# Patient Record
Sex: MALE | Race: WHITE
[De-identification: names, ages, dates, MRNs, and addresses within clinical notes are randomized per-mention and may not be internally consistent; named-entity substitution may affect disease eponyms.]

---

## 2018-04-11 ENCOUNTER — HOSPITAL ENCOUNTER (OUTPATIENT)
Dept: HOSPITAL 95 - LAB SHORT | Age: 63
Discharge: HOME | End: 2018-04-11
Attending: INTERNAL MEDICINE
Payer: COMMERCIAL

## 2018-04-11 DIAGNOSIS — E78.4: Primary | ICD-10-CM

## 2018-04-11 DIAGNOSIS — R73.02: ICD-10-CM

## 2018-04-11 DIAGNOSIS — I10: ICD-10-CM

## 2018-04-11 LAB
CREAT UR-MCNC: 197 MG/DL (ref 27–270)
MICROALBUMIN UR-MCNC: 10.8 MG/L (ref 0–20)
MICROALBUMIN/CREAT UR: 5.48 MG/G (ref 0–30)

## 2019-04-03 ENCOUNTER — HOSPITAL ENCOUNTER (OUTPATIENT)
Dept: HOSPITAL 95 - LAB SHORT | Age: 64
Discharge: HOME | End: 2019-04-03
Attending: INTERNAL MEDICINE
Payer: COMMERCIAL

## 2019-04-03 DIAGNOSIS — F19.10: Primary | ICD-10-CM

## 2019-06-25 NOTE — NUR
PT. OUT WALKING IN HATCH WITH CANE, TOLERATING WELL .  PT. HAS BACK BRACE IN
PLACE.  NO NOTEABLE CHANGES THIS SHIFT.  RT HAS CHECKED OUT HIS CPAP AND SET
IT UP.

## 2019-06-25 NOTE — NUR
PT. SITTING ON EDGE OF BED, DRESSING ON LUMBAR SURGICAL INCISION STARTING TO
ROLL UP.  STRAIGHTENED IT OUT AND REINFORCED THE BOTTOM OF DRESSING.  DSG ON
DRAIN SITE SATURATED WITH CHILDERS FLUID.  REMOVED DRESSING AND CLEANED AREA WITH
SKINTEGRITY AND REPLACED WITH MEPILEX DRSG.

## 2019-06-26 NOTE — NUR
PT. SITTING ON EDGE OF BED EATING DINNER.  FAMILY AT BEDSIDE.  PT. PUT ON A
FLUID RESTRICITON TODAY OF 1500 ML.  HAS BEEN UP AMBULATING IN HATCH WITH P.T.
AND A CANE.  BRACE IN PLACE.  NO NOTEABLE CHANGES THIS SHIFT.

## 2019-06-26 NOTE — NUR
SHIFT SUMMARY
PATIENT IS ALERT AND ORIENTED. AMBULATED THROUGH THE HATCH WITH BACK BRACE ON.
PATIENT DID NOT BEND, TWIST OR LIFT ANYTHING HEAVIER THAN 5LBS. PATIENT DID
COMPLAIN OF PAIN AND INFORMED ME THAT THE SURGEON WANTED HIM TO BE GETTING HIS
PAIN MEDICATIONS Q3 FOR 11 DAYS. PATIENTS ORDERS WERE FOR Q4H. HOSPITALIST
CHANGED THIS TO Q3H. PATIENT SLEPT WITH CPAP ON. USES CALL LIGHT
APPROPRIATELY. INDEPENDENT IN ROOM. NO NEW CHANGES THROUGHOUT THE NIGHT.
VITALS STABLE. NO COMPLAINTS OF CHEST PAIN OR SOB THROUGHOUT SHIFT. DID
COMPLAIN OF RIGHT SHOULDER PAIN THAT IS CHRONIC.

## 2019-06-27 NOTE — NUR
DISCHARGE SUMMARY
PATIENT A&O X4. C/O PAIN T/O SHIFT. MEDICATED PER E MAR. DENIES SOB OR NAUSEA.
SURGICAL DRESSING IN PLACE TO LOWER BACK, WNL. MEPILEX DRESSING TO OLD DRAIN
SITE CHANGE THIS SHIFT, C/D/I NO DRAINAGE PRESENT. ALL DISCHARGE INSTRUCTIONS
REVIEWED WITH THE PATIENT. MEDICATIONS FAXED. PATIENT EDUCATION PROVIDED. IV
D/C, WNL. PATIENT SENT HOME ON AN 1,800 ML FLUID RESTRICTION. CNA ESCORTED
PATIENT OUT, ALL BELONGINGS IN HAND.

## 2019-06-27 NOTE — NUR
SHIFT SUMMARY: 62 Y/O OBESE MALE RESTED COMFORTABLY ALL SHIFT WHILE WEARING
C-PAP. PTS VOICED ADEQUATE PAIN CONTROL WITH OXYCODONE FOR BACK DISCOMFORT. PT
ALERT AND ORIENTED X 4; ABLE SWALLOW ALL MEDICAITIONS WITHOUT ISSUE. PT
WEARING BACK BRACE ALL SHIFT WITH BACK DRESSINGS DRY AND INTACT. TELEMETRY
REFLECTS NSR. PTS VANCOMYCIN TROUGH LEVEL WAS 18.9 WITH NEXT LEVEL DUE 6/27.
PTS BED LOW POSITION, CALL LIGHT AT SIDE.

## 2023-02-16 NOTE — NUR
END OF SHIFT NURSING REPORT  - PM
Patient admitted for acute respiratory failure with eTOH 363 and states
drinking wiskey last night. CIWA 7 and medicated with Librium. /74 ,
medicated with hydralazine as ordered.  Hx of sleep apnea, facility provided
CPAP initiated upon arrival. He declines pain, but states headache and feeling
anxious.  2 nurse skin assessment completed. Abdomen round and distended,
abdominal sounds active in all quadrants.
 
Opted IN for Flu Vaccine and administered to left deltoid.

## 2023-02-16 NOTE — NUR
PT IS A/OX4, PLEASANT AND COOPERATIVE. FLUSHED LOOK. PT IS UP WITH ASSIST TO
THE BATHROOM. PT IS SHAKEY AND HAS MILD HALUCINATIONS AT TIMES. ATIVAN AND
LIBRIUM GIVEN FOR WD SYMPTOMS. THE PT WAS GIVEN PRN HYDRAZOLINE FOR ELEVATED
BP TODAY. PT IS ON TELE. PT MEDICATED FOR PAIN X2 SO FAR TODAY. CALL LIGHT IN
REACH. WILL CONTINUE TO MONITOR AND ASSESS FOR CHANGES.

## 2023-02-17 NOTE — NUR
PT IS A/OX4, PLEASANT AND COOPERATIVE. THE PT IS UP IND IN HIS ROOM TO THE
BATHROOM. TODAY THE PT SEEMED TO HAVE LESS TREMOR COMPARED TO YESTERDAY. THE
PT DENIED ANY HALUCINATIONS TODAY. PT WAS GIVEN LIBRIUM X2 SO FAR TODAT. PT
WAS MEDICATED FOR CHRONIC BACK PAIN X3 SO FAR TODAY. PT RECIEVED BREATHING
TX'S T/O THE DAY. PT IS ON 02 1L/MIN PT WERE CPAP WHILE NAPPING. CALL LIGHT IN
REWACH. WILL CONTINUE TO MONITOR AND ASSESS FOR CHANGES

## 2023-02-17 NOTE — NUR
PT IS A&O4, SB TO THE BR, USES CPAP @NIGHT, CIWAS <7 OVERNIGHT, PT STATES
FEELS MUCH BETTER, NO COMPLAINTS OF PAIN OR DISCOMFORT,CONTINUE POC

## 2023-02-18 NOTE — NUR
SHIFT SUMMARY
A&O X 4. VSS. INDEPENDENT IN THE ROOM FOR RESTROOM USE. MEDICATED FOR C/O
CHRONIC BACK PAIN PER EMAR WITH GOOD RELIEF STATED BY PT. PT IS MAINTAINING O2
SATS >95% ON RA. WEARS C-PAP AT NOC WITH 2 L'S O2 BLEED IN. NEW PIV STARTED IN
L AC WITH 20 G X 1 ATTEMPT. PIV IN R AC LEAKING, DC'D WITH CATH TIP INTACT, NO
REDNESS OE SWELLING NOTED. PT PLEASANT & COOPERATIVE WITH ALL CARE.

## 2023-02-18 NOTE — NUR
SHIFT SUMMARY;
 
NO ACUTE CHANGES OVERNIGHT. THE PT IS AXO X4 AND INDEPENDENT TO THE BATHROOM.
THE PT USES CPAP AT NIGHT WITH 3L BLEED IN, O2 SATS WERE >92%. TELE IS IN
PLACE, [place] IN THE 50'S T/O THE NIGHT. THE OPT REQUESTED PRN PAIN
MEDICATION TWICE FOR CHRONIC PAIN LAST NIGHT. THE PT WAS HYPERTENSIVE LAST,
HYDRALAZINE WAS GIVEN TWICE. THIS AM THE PTS B/P REMAINED UNCHANGED FROM
RECIEVEING HYDRALAZINE.
CURRENTLY THE PT IS RESTING IN BED WITH NO ACUTE COMPLAINTS, THE BED IS
IN THE LOWEST POSITION AND THE CALL LIGHT IS AT BEDSIDE.

## 2023-02-19 NOTE — NUR
LYING IN SEMI FOWLERS WITH EYES OPEN WHILE EATING A SNACK AND WATCHING TV.
HAS RESTED MINIMALLY THIS SHIFT.  HE WAS ABLE TO SLEEP FROM 11M TO 5AM.  ONCE
HE WOKE UP HE WAS UPSET THAT NURSING DIDN'T WAKE HIM UP TO MEDICATE HIM FOR
PAIN.  NURSING REITERATED THAT PRN MEDS ARE GIVEN WHEN ASKED FOR AND SCHEDULED
MEDS ARE GIVEN WHEN SCHEDULED. AND THAT HIS PAIN MEDS ARE PRN.  HE VOICED
UNDERSTANDING FOR THE SECOND TIME, BUT STATED THAT HE BELIEVED THAT HIS PAIN
MEDS SHOULD BE SCHEDULED.  CONTINENT OF BOWEL AND BLADDER, ABLE TO AMBULATE TO
BATHROOM.  CURRENTLY DENIES PAIN, DISCOMFORT, OR FURTHER NEEDS.  SAFETY
MEASURES IN PLACE.  WILL GIVE HAND OFF TO ONCOMING SHIFT USING SBAR DURING
BEDSIDE REPORT.  WILL CONTINUE TO MONITOR AND ADDRESS NEEDS AS THEY ARISE.

## 2023-02-20 NOTE — NUR
SHIFT SUMMARY;
 
NO ACUTE CHANGES OVERNIGHT. THE PT RESTED IN BED T/O THE NIGHT. THE PT IS AXO
X4 AND INDEPENDENT IN THE ROOM. THE PT WAS MEDICATED WITH FLEXIRIL AND
PERCOCET FOR CHRONIC PAIN OVER THE NIGHT. TELE IS IN PLACE, SIN[place]
40/50'S T/O THE NIGHT. THE PT SLEPT WITH HIS CPAP ON, 2L BLEED IN WITH O2 SATS
>95%. CURRENTLY THE PT IS RESTING IN BED WITH THE BED IN THE LOWEST POSITION
AND THE CALL LIGHT AT BEDSIDE.

## 2023-02-20 NOTE — NUR
PT BROUGHT DOWN TO ER ENTRANCE AND PICKED UP BY [employer]IM-FRIEND TO BRING HIM
HOME. PT STABLE. PT LEFT WITH ALL BELONGINGS AND SIGNED ALL REQUIRED
PAPERWORK.

## 2023-09-17 NOTE — NUR
PT ARRIVED TO  351 VIA GURNEY FROM THE ER. A/O X 4. ABLE TO TRANSFER SELF TO
 BED. PT CAN STAND AND MOVE INDEPENDENTLY. WILL CONTINUE TO PROVIDE CARE T/O
SHIFT. CALL LT IN REACH.

## 2023-09-18 NOTE — NUR
NOTE:
 
NOTIFED BY TELE OF PT SUSTAINING 130'S-160'S, PROVIDER NOTIFIED.  IV LOPRESSOR
ADMINISTERED, PT'S HR NOW AT 87.

## 2023-09-18 NOTE — NUR
NOTIFIED DR THAT PT WAS UNABLE TO VOID AND REQUEST A RODRIGUEZ CATHETER. ORDER
GIVEN. 16 COUDE RODRIGUEZ PLACED FOR RETENTION USING STERILE TECHNIQUE.
IMMEDIATELY AFTER INSERTION GREAT RETURN OF YELLOW URINE FLOWED INTO BAG. PT
STATES FEELING RELIEF. PT HAD TRIED TO VOID BEFORE CATHETER PLACEMENT AND
HEART RATE BECAME ELEVATED. DR WAS NOTIFIED OF ELEVATED HEART RATE. ORDER TO
DO AN EKG WAS RECEIVED AS WELL AS IV LOPRESSOR. PT IS ON TELE.

## 2023-09-18 NOTE — NUR
CPAP IN PLACE. CONT OXIMETER ON, SATS 93%. NS INFUSING  MLS/HR, FOLIC
ACID INFUSING, KCL IVPB INFUSING. CALL LT IN REACH.

## 2023-09-18 NOTE — NUR
SHIFT SUMMARY
VSS. ON RM AIR, SATS >90%. TELEMETRY SHOWING SR 80'S. NO REPORT OF CHEST
PAIN/PRESSURE. AOX3-4. CIWA'S RANGING 4-18. CURRENTLY EXPERIENCING ANXIETY AND
VISUAL HALLUCINATIONS. BED ALARM IN PLACE. ADMINISTERED
TOTAL OF 4MG IV ATIVAN AND 50MG LIBRIUM.  WILL CONTINUE TO MONITOR. PT
REPORTED ONGOING CONSTIPATION, DESPITE ORAL SOFTENERS ADMINISTERED PRIOR TO
TRANSFER TO UNIT. PRUNE JUICE PROVIDED W/ NO RELIEF. AT PT REQUEST,
ADMINISTERED DULCALOX PER EMAR, MULTIPLE LOOSE BM'S SINCE. AMBULATING TO
RESTROOM W/ SBA FWW. CATHETER IN PLACE DRAINING SUJATA, RED-TINGED URINE. WILL
REPORT TO ONCOMING RN.

## 2023-09-18 NOTE — NUR
ASSUMPTION OF CARE
PT ARRIVED TO UNIT VIA HOSPITAL BED AT APPROX 1145. BEDSIDE REPORT COMPLETED.
AOX4, COOPERATIVE W/ CARE AND RECEPTIVE TO EDUCATION. ACUTE ETOH, DENIES
VISUAL HALLUCINATIONS AT THIS TIME. BED ALARM ON. SEE WITHDRAWAL
DOCUMENTATION. VSS. TELEMETRY SHOWING SR 70'S. DENIES CP/PRESSURE. CURRENTLY
ON RM AIR, SATS >90%.  BIPAP PRESENT AT BEDSIDE. PT AMBULATES W/ SBA FWW TO
RESTROOM, REPORTS CONSTIPATION, PROVIDED PRUNE JUICE. WILL CONTINUE TO
MONITOR.  RODRIGUEZ CATHETER IN PLACE DRAINING TO GRAVITY, DRAINING SUJATA URINE.
IS ABLE TO INDEPENDENTLY CHANGE POSITION IN BED. CALL LIGHT IN REACH.

## 2023-09-19 NOTE — NUR
ASSUMED CARE
 
PATIENT ARRIVED TO ICU FROM PCU @ 0740. PATIENT IS LAYING IN BED WITH EYES
CLOSED ON ROOM AIR. SPO2 GREATER THAN 90%. PATIENT ANSWERS QUESTIONS WITH
SLURRED SPEECH AND WHISPERING. NO FAMILY AT BEDSIDE. BELONGINGS PALCED IN
CUPBOARD IN ROOM. CIWA 14 AT TIME OF ARRIVAL, PRECEDEX STARTED @ 0.3MCG/KG/HR.
PADDING APPLIED TO BED SIDERAILS FOR SEIZRURE PRECAUTIONS AND SUCTION AT
BEDSIDE. RODRIGUEZ PATENT AND DRAINING DARK ORANGE URINE TO GRAVITY. BEDSIDE
REPORT RECEIVED FROM NITA WEBSTER.

## 2023-09-19 NOTE — NUR
AOC:
PATIENT ON ASSUMPTION WAS MINIMALLY WITHDRAWALING, THEN AS THE SHIFT
PROGRESSED AROUND 2000 STARTED TO GO THROUGH INCREASED MENTATION, WITH
UNSAFE BEHAVIORS, LEAVING THE BED, TRYING TO FALL OFF THE RAILING.
PULLING AT LINES, CORDS, RODRIGUEZ. VISUALIZED PATIENT HAVING HALLUCINATIONS.
PATIENT ALERT TO SELF AND PLACE( AT TIMES ), CHRONIC FOEY WAS PINK DUE TO
HIM PULLING AT CATH, SITTING ON CATHETER, PULLING. IS STARTING TO
MINIMALLY LIGHTENING TO SUJATA. PATIENT SINCE THE BEGINNING OF THE SHIFT
HAS RECIEVED 50 OF LIBRIUM AND 14mg OF ATIVAN, AS PATIENT I DO NOT
BELIEVE WOULD BE CAPABLE OF ALERTLY SWALLOWING. PATIENT SANDY IS
WEARING CPAP, SPO2 >94% BED ALARM ON ICU RN HAS ALREADY INSPECTED
PATIENT. POWERGLIDE WAS PLACED. PATIENT STILL PREFORMING UNSAFE BEHAVIORS
AS SOON AS THE ATIVAN WEARS OFF.

## 2023-09-19 NOTE — NUR
SHIFT SUMMARY
 
PATIENT REMAINED OFF PRECEDEX FOR MOST OF THE SHIFT D/T PROLONGED QTC AND CIWA
APPROPRIATE FOR ATIVAN. MEDICATED WITH ATIVAN PER EMAR. PATIENT HAD ONE RUN OF
SVT BUT CONVERTED BACK TO SINUS RHYTHM. PERIODS OF IRREGULAR TACHY RHYTHM ON
MONITOR. METOPROLOL 5MG IV PUSH Q5M X 3 DOSES PRN ORDERED BY DR. EGAN IF
NEEDED. BIPAP PLACED ON PATIENT AT 16/7 WITH SPO2 LOW TO MID 90'S. RODRIGUEZ
PATENT AND DRAINING TO GRAVITY. NO OTHER CHANGES DURING SHIFT.

## 2023-09-19 NOTE — NUR
ATIVAN ORDERS
 
CURRENT CIWA 15. ORDERS IN EMAR ARE FOR PO ATIVAN BUT PATIENT IS AN ASPIRATION
RISK R/T AMS. CALL MADE TO DR. EGAN FOR UPDATED ATIVAN ORDERS TO BE IV.
TELEPHONE ORDERS RECEIVED.

## 2023-09-19 NOTE — NUR
PATIENT RESTLESS AND MOANING, OPENS EYES TO VERBAL, MOANING WHEN ASKED
QUESTIONS, BIPAP OFF AND ORAL CARE DONE PATIENT RESP 30'S AND CONTINUES TO BE
RESTLESS. PRECEDEX 0.2 STARTED AND ORDER FOR IV PAIN CONTROL OBTAINED DUE TO
ASPIRATION RISK.

## 2023-09-19 NOTE — NUR
EOS:
PATIENT WITH MUCH IMPROVEMENT WITH ALMOST MAXED OUT ATIVAN FOR 24 HOUR,
URINE SUJATA LESSENING PINK. CURRENLTY ON CPAP OR BIPAP RT INVOLVED WITH
SATURATING WELL. PATIENT WAS BATHED PLACED ON GREEN LIFT SHEET, AND WAS
EDUCTED, AND ALMOST 1:1 FOR MULTIPLE HOURS THROUGH THE NIGHT, CURRENTLY
RESTING WELL SATURATING WELL. VSS IMPROVING CLONIDINE .1mg PATCH PLACED
LEONARDO. HYPERTENSIVE AND MODERATELY IMPROVED THIS AM.

## 2023-09-19 NOTE — NUR
PROLONGED QTC.
 
PATIENT QTC ON MONITOR SHOWS PROLONGATION OVER 600. EKG COMPLETED AND SHOWED
QTC . PRECEDEX PLACED ON SB AND CALL PLACED TO DR. EGAN TO NOTIFY. DR. EGAN TO COME SEE PATIENT AT BEDSIDE.

## 2023-09-19 NOTE — NUR
EKG RECHECK
 
EKG PERFORMED TO RECHECK QTC AFTER CALCIUM GLUCONATE INF. NEW QTC 554MS DOWN
FROM 652MS THIS MORNING. DR. EGAN NOTIFIED.

## 2023-09-20 NOTE — NUR
AFTER REPOSITIONING PATIENT VERBALIZED I NEED TO GET UP, SAYING YES TO PAIN.
PT UNABLE TO EXPLAIN WHERE HE WAS HURTING. PATIENT RELAXING AFTER FENTANYL IV.
PRECEDEX 0.2 CONTINUES

## 2023-09-20 NOTE — NUR
SUMMARY
PATIENT MORE AWAKE AS NIGHT PROGRESSED. ABLE TO FOLLOW SIMPLE DIRECTIONS, BUT
HAVING CONFUSED CONVERSATIONS. PRECEDEX 0.2 MCG INFUSING. FENTANYL GIVEN FOR
PAIN AND ATIVAN GIVEN WHEN HAVING INCREASED AGITATION, AND ANXIETY.
ATTEMPTING TO PROVIDE MINIMAL SEDATION DUE TO PROLONGED QT. HOLDING PO
MEDICATIONS DUE TO ASPIRATION RISK. RODRIGUEZ REMAINS IN PLACE DRAINING DARK
ORANGE URINE HAS LIGHTENED SLIGHTLY AS NIGHT PROGRESSED.

## 2023-09-20 NOTE — NUR
SHIFT SUMMARY
 
PATIENT PRECEDEX TITRATED UP TO 0.3MCG/KG/HR AND NS @ 100ML/HR. MEDICATED
WITH ATIVAN FOR CIWA 14-15 PER EMAR. KCL 40MEQ INF NOW FOR POTASSIUM OF 2.4.
ADDITIONAL 20MEQ ORDERED FOR A TOTAL OF 60MEQ TO BE GIVEN.
 
PATIENT WAS ABLE TO CONVERSE WITH STAFF, BUT IS STILL CONFUSED AND NOT
ORIENTED. BIPAP UTILIZED INTERMITTENTLY T/O DAY.  PATIENT FREQUENTLY PULLING
OFF MASK.
 
PATIENT REMAINS NPO D/T ASP RISK. NO BM THIS SHIFT. 2L OUT OF RODRIGUEZ.
 
NO OTHER CHANGES DURING SHIFT.

## 2023-09-20 NOTE — NUR
ASSESSMENT:
PT CONFUSED AND ORIENTED ONLY TO SELF. SPEECH IS GARBLED WITH SOME SENSICAL
WORDS, BUT PHRASES DON'T PERTAIN TO THE QUESTION ASKED.
LS DIMINISHED T/O WITH BIOX 93-97% ON RA. KEPT PULLING AT BIPAP MASK; SO KEPT
IT OFF FOR NOW, WILL TRY AGAIN LATER.
HEART SOUNDS IRREGULAR WITH MONITOR SHOWING SR WITH PAC'S WITH HR . SKIN
PINK, WARM AND DRY. PPP BILAT AND STRONG. BILAT RADIAL PULSES STRONG.
20G IV LFA FLUSHED AND S/L. POWERGLIDE LEONARDO WITH PRECEDEX AT 11.4CC/HR=
0.3MCG/KG/HR WITH A NS @ 10CC/HR. POWERGLIDE DRAWS BACK BLOOD.
ABD R/D AND FIRM WITH HYPO BTX4.
RODRIGUEZ DRAINING DARK YELLOW URINE.

## 2023-09-21 NOTE — NUR
SHIFT SUMMARY
 
NO ACUTE EVENTS THIS SHIFT AFTER MORNING EPISODE OF SVT. PATIENT RECEIVED A
TOTAL OF 80 MEQ POTASSIUM IV, 1G CALCIUM GLUCONATE IV, AND 2G MAG SULFATE IV.
REPEAT POTASSIUM LAB DRAWN, BUT NOT RESULTED YET.
 
PRECEDEX TITRATED OFF. PATIENT PASSED BEDSIDE SWALLOW EVAL AND IS NOW ABLE TO
TAKE PO MEDS. MEDICATED WITH ATIVAN, LIBRIUM, AND OXYCODONE TODAY PER EMAR.
PATIENT IS A&O X 3. INTERMITTENTLY CONFUSED ON PREVIOUS CONVERSATIONS HAD, BUT
KNOWS PLACE, YEAR, AND SITUATION. REMAINS ON 2LPM VIA NC T/O SHIFT WITH SPO2
HIGH 90'S AND NO APNEIC OR OBSTRUCTIVE EPISODES. RODRIGUEZ PATENT AND DRAINED OVER
1600ML URINE. NO BM THIS SHIFT. ECHO COMPLETED.

## 2023-09-21 NOTE — NUR
SHIFT ASSESSMENT:
PT A&O X4. HAS SOME CONFUSED CONVERSATION, BUT HE'S APPROPRIATE WITH CARE. C/O
PAIN IN SHOULDERS AND BACK; STATES HE'S USED TO WALKING AND NOT BEING IN A BED
ALL DAY.
LS CLEAR WITH WHEEZES IN UPPER LOBES, BUT DIMINISHED IN BASES WITH BIOX 98% ON
2L N/C. KEEPS C/O SOB BUT STATES HE'S NOT READY TO WEAR HIS BIPAP.
HEART SOUNDS IRREGULAR WITH MONIOTR SHOWING SR WITH PAC'S WITH HR 'S.
PPP BILAT AND STRONG. BILAT RADIAL PULSES STRONG. POWERGLIDE LEONARDO WITH NS@
10CC/HR.
ABD R/D AND FIRM WITH HYPO BT X4.
RODRIGUEZ DRAINING SUJATA URINE.

## 2023-09-21 NOTE — NUR
SHIFT SUMMARY:
PT HAS BEEN CONFUSED AND RESTLESS THORUGHOUT THE NIGHT. NOT REALLY TOLERATING
THE BIPAP UNTIL THIS AM.  HAS BEEN MEDICATED WITH ATIVAN TWICE THROUGHOUT THE
NIGHT; PRECEDEX CONTINUES TO RUN. UNABLE TO TAKE PO MEDICATIONS SAFELY.
EKG OBTAINED THIS AM SHOWS IMPROVED  AND .

## 2023-09-21 NOTE — NUR
TACHYCARDIA
 
PATIENT WAS LYING IN BED @ 0719 WHEN HE CONVERTED INTO A POSSIBLE SVT RHYTHM.
RATE 160'S AT HIGHEST, QRS 0.07. PATIENT WOULD NOT VERBALLY RESPOND TO STAFF.
SPO2 MAINTAINED. PATIENT BED SAT UPRIGHT AND BIPAP REMOVED. PATIENT WAS ORALLY
SUCTIONED BY ANOTHER NURSE AND WAS CONVERTED BACK TO SINUS RHYTHM WITH RATE
90'S. DR. EGAN NOTIFIED OF EVENT AND MORNING LABS SHOWING POTASSIUM OF 2.7,
CALCIUM 7.1, AND ALBUMIN OF 2.4. ORDERS RECEIVED FOR INTENSIVIST CONSULT, MAG
BLOOD CHECK. AWAITING FOR CALCIUM AND POTASSIUM REPLACEMENTS.

## 2023-09-22 NOTE — NUR
SHIFT SUMMARY
PT INITIALLY AWAKE, AND ALERT THIS MORNING. PT WAS ABLE TO ANSWER SIMPLE
QUESTIONS, BUT FORGETFUL. PT CONTINUED TO BECOME INCREASINGLY CONFUSED AND NOT
ABLE TO BE REDIRECTABLE. PT MED WITH ATIVAN AND LIBRIUM PER EMAR. PRECEDEX
RESTARTED THIS SHIFT AND TITRATED UP TO 0.5 MCG/KG/HR AT THIS TIME. PT IS
RESTING COMFORTABLY AT THIS TIME. NS INFUSING  ML/HR. PT ABLE TO TAKE PO
INTAKE THIS MORNING WHEN AWAKE. VITAL SIGNS STABLE AT THIS TIME. CARDIZEM GTT
TIRATED OFF THIS AFTERNOON. RODRIGUEZ REMAINS IN PLACE WITH DARK YELLOW OUTPUT
NOTED. PT SPOUSE UPDATED VIA PHONE THIS SHIFT. WILL CONTINUE TO MONITOR AND
REPORT OFF TO ONCOMING RN.

## 2023-09-22 NOTE — NUR
SHIFT SUMMARY:
PT HAS BEEN MORE ALERT AND TALKATIVE THRU THE NIGHT WITH BOUTS OF CONFUSED
TALK.  MULTIPLE ATTEMPTS TO PUT BIPAP ON, BUT PT KEEPS TAKING IT OFF. PT'S
RHYTHM IS SO IRREGULAR THRU THE NIGHT; EKG OBTAINED AND SHOWING SR WITH PAC'S
AND PVC'S.  CALLED MD WITH ORDER TO GIVE CARDIZEM IVP AND TO START THE GTT.
CARDIZEM GTT AT 20MG/HR.
K+ LOW AGAIN THIS AM; ORDER OBTAINED FOR MORE K+ IV.

## 2023-09-22 NOTE — NUR
ASSUMED CARE OF PT AT 1900.
VITALS WNL AT THIS TIME.  PRECEDEX AT 0.5 MCG.
PT SLEEPING IN BED.
SEE FULL ASSESSMENT FOR FURTHER INFORMATION.

## 2023-09-22 NOTE — NUR
PRECEDEX PUT ON STAND BY AT THIS TIME.  PT CAN NOT WAKE UP ENOUGH TO SWALLOW
PILLS.  WILL MONITOR AND TRY NIGHT MEDICATIONS WHEN PT IS MORE AWAKE.

## 2023-09-23 NOTE — NUR
ASSUMED CARE OF PT AT 1900
PT AWAKE IN ROOM TALKING TO NO ONE.  PT CONFUSED DURING SHIFT REPORT.
VITALS WNL FOR BASELINE AT HOME.  2 LPM NC FOR COMFORT.
NS  MLS/HR
PRECEDEX 0.5 MCG
SEE FULL ASSESSMENT FOR FURTHER INFORMATION.

## 2023-09-23 NOTE — NUR
END OF SHIFT SUMMARY
NEURO- PT A/O X4 AT THIS TIME. CPAP WORN MOST OF THIS SHIFT UNTIL PRECEDEX WAS
PUT ON SB AT 0430.
 
CARDIAC- HR 60-80'S THROUGHOUT SHIFT.  BP STABLE THROUGHOUT SHIFT AS WELL.
AFTER PRECEDEX PUT ON SB BOTH BP AND HR INCREASED SLIGHTLY.
 
RESP-PT NOT ON OXYGEN SUPPLEMENT AT THIS TIME AND IS HOLDING SPO2 >95%.
 
GI,- RODRIGUEZ DRAINING TO GRAVITY WITH SMALL URINE OUTPUT.  (SEE I/O CHARTING)
 
IV- NS  MLS/HR TO PG IN LEFT UPPER ARM.
 
WILL CONTINUE TO MONITOR UNTIL REPORT GIVEN TO AM RN.

## 2023-09-23 NOTE — NUR
SHIFT SUMMARY
PT HAS REMAINED CONFUSED AND FORGETFUL THROUGHOUT THE DAY. PT IS ABLE TO
ANSWER SOME QUESTIONS APPROPRIATELY, BUT SPEECH IS MOSTLY NONSENSICAL. PT
SEDATED WITH PRECEDEX AT 0.5 MCG/KG/HR. PT MED WITH ATIVAN AND LIBRIUM PER
EMAR. NS INFUSING  ML/HR. PT ON 2L O2 NC. PT WITH SPO2 >90% ON ROOM AIR,
BUT PT REPORTS SOB AND INCREASED WORK OF BREATHING ON RA. VITAL SIGNS STABLE.
RODRIGUEZ REMAINS IN PLACE WITH CLOUDY YELLOW URINE OUTPUT NOTED. PT ABLE TO SHIFT
SELF IN BED WITH MINIMAL ASSISTANCE. PT SPOUSE UPDATED VIA PHONE. WILL
CONTINUE TO MONITOR AND REPORT OFF TO ONCOMING RN.

## 2023-09-23 NOTE — NUR
PT REQUESTING RT FOR BREATHING TREATMENT AGAIN.  PT INFORMED Q4 RT PRN
TREATMENTS.  PT PUT ON 2 LPM NC FOR COMFORT.

## 2023-09-23 NOTE — NUR
PT HAS BEEN OFF OF PRECEDEX SINCE 0424 THIS MORNING.  PT IS WATCHING
TELEVISION AND USING CALL LIGHT APPROPRIATELY.  REQUESTED BREATHING TREATMENT.
 RT CALLED INTO ROOM.  PT BOOSTED IN BED AND GIVEN SMALL SIPS OF WATER.
ANSWERING QUESTIONS APPROPRIATELY.  A/O X4 AT THIS TIME.

## 2023-09-24 NOTE — NUR
ASSUMED CARE
 
ASSUMED CARE OF THIS PATIENT AT 0715. PATIENT IS LYING IN BED WITH BIPAP IN
PLACE 16/7 FIO2 21%. PRECEDEX OFF AND NS @ 100ML/HR INF. NO FAMILY OR VISITORS
AT BEDSIDE. ATTENDS IN PLACE WITH NO RODRIGUEZ. MONITOR SHOWS SR WITH PAC'S AND
PVC'S, RATE 80'S-90'S. SPO2 MID 90'S. REPORT RECEIVED FROM NITA TREVINO.

## 2023-09-24 NOTE — NUR
SHIFT ASSESSMENT:
PT A&O X4; STATES HE'S IN [place], THEN CHNAGES HIS STORY AND STATES HE'S IN
[place] AND THAT HE WAS JUST JOKING.  HARD TO TELL IF HE'S JOKING OR COVERING
UP THAT HE'S A BIT CONFUSED STILL.
LS CLEAR WITH EXP WHEEZES T/O WITH BIOX 94% ON RA. RT AT BEDSIDE TO GIVE A NEB
TX. ORAL CARE DONE, THEN I PLACED BIPAP IN PT.
HEART SOUNDS IRREGULAR WITH MONIOTR SHOWING SR WITH PAC'S WITH HR . PPP
BILAT AND STRONG. BILAT RADIAL PULSES STRONG. POWERGLIDE LEONARDO WITH NS @
100CC/HR.
ABD R/D AND VERY FIRM.  HYPO BTX4. VOIDS PER ATTNEDS; EACH VOID HE SOAKS THE
ATTENDS AND THE LINENS. LINENS CHANGED.

## 2023-09-24 NOTE — NUR
SHIFT SUMMARY
 
PATIENT PRECEDEX REMAINED OFF T/O SHIFT. MEDICATED WITH ATIVAN AND LIBRIUM PER
EMAR FOR ETOH WITHDRAWAL. HIGHEST CIWA OF 9 THIS SHIFT. INTERMITTENT VISUAL
HALLUCINATIONS AND BOUTS OF CONFUSION, BUT OVERALL MOSTLY ORIENTED KNOWING
PLACE AND YEAR. RODRIGUEZ REMAINS OUT TO PREVENT INFECTION WITH ATTENDS IN PLACE.
ONE INCONTINENT VOID THIS SHIFT. PATIENT WAS ABLE TO TAKE PO MEDS AND
NUTRITION T/O SHIFT. NO BM, BOWEL CARE ADMINISTERED PER EMAR. MEDICATED WITH
HYDRALAZINE FOR HTN TWICE PER EMAR. NO OTHER CHANGES THIS SHIFT.

## 2023-09-24 NOTE — NUR
END OF SHIFT SUMMARY
PT RESTED MOST OF THE NIGHT ON PRECEDEX 0.5 MCG.  PT WAKING UP THIS AM AND
PRECEDEX ON STANDBY SINCE 0500 THIS AM.  PT IS FOLLOWING COMMANDS AND HAS
ASKED FOR BLANKETS AS HE IS FEELING COLD.  CPAP WORN FOR THE LAST 5 HOURS.
NS  MLS/HR.
PRECEDEX SB.
RODRIGUEZ CATHETER DC'S D/T PT PULLED TUBING OUT.
PT HAS NOT ASKED TO USE URINAL.  2 BED CHANGES DONE THIS SHIFT WITH ONLY URINE
SATURATION.  NO BM THIS SHIFT.
PLAN THIS AM IS TO HAVE PT OFF OF PRECEDEX AND BACK TO BASELINE.
 
WILL CONTINUE TO MONITOR UNTIL DAY RN IS GIVEN REPORT.

## 2023-09-24 NOTE — NUR
HTN
 
PATIENT /118  MEDICATED WITH HYDRALAZINE 10MG IV AND BIPAP PLACED
BACK ON PATIENT WHILE PATIENT IS ASLEEP.

## 2023-09-25 NOTE — NUR
ATTEMPTED TO MEDICATE THE PT-
PT HAS ORDER FOR SPIRONOLACTONE HOWEVER HE OPENS ONE EYE WHEN STERNAL RUB IS
PERFORMED, UNABLE TO ROUSE FURTHER. UNABLE TO MEDICATE D/T ASPIRATION RISK.

## 2023-09-25 NOTE — NUR
CALLED DR PATTON-
PT REMAINS UNRESPONSIVE TO STAFF VOICES. STERNAL RUB GARNERS A LEFT EYE
CRACKED OPEN BUT NO APPEARANCE OF UNDERSTANDING. CALED DR PATTON AND SPOKE TO
HER ABOUT THE PT AND THE ELEVATED RESP RATE OF 32 ON LAST VITALS. RECIEVED
ORDER FOR ABG NOW.

## 2023-09-25 NOTE — NUR
SHIFT SUMMARY-
PT HAS BEEN MUMBLING INCOHERENT WORDS AND SOUNDS, STAFF ARE NOT ABLE TO
UNDERSTAND HIM AT THIS TIME. MD AWARE PT HAS NOT BEEN ABLE TO EAT DUE TO BEING
TOO TIRED WITH SLURRED SPEECH. MD ORDERED IVF FOR THE PT TO MAINTAIN HYDRATION
WHILE HE IS NPO. PT HAD A SMALL RUN OF ELEVATED HR THIS EVENING, NO S&S OF
DISTRESS, VSS. VS HAVE BEEN DONE Q2 AS THE PT WAS HAVING VEWS SCORE OF 4. PT
HAS AN ELEVATED RESP RATE IN THE 30'S. MD IS AWARE, THIS IS NOT NEW, SEE
VITALS FOR TREND. PT IN BED, BIPAP IN PLACE WITH 2L BLEED IN, ON CONT BIOX AND
TELE NSR WITH PACS IN THE 80'S. NO CURRENT S&S OF DISTRESS, WILL PASS ON TO
NIGHT RN IN BEDSIDE REPORT.

## 2023-09-25 NOTE — NUR
TRANSFER NOTE-
PT TRANSFERED TO MEDICAL FLOOR FROM ICU. PER REPORT THE PT HAS HAD NEGATIVE
CIWAS T/O THE DAY. PT ARRIVED ON MEDICAL FLOOR ON CPAP, RT AT THE BEDSIDE
SETTING UP THE BIOX AND CPAP MACHINE IN THE ROOM. PT UNRESPONSIVE TO STAFF
ATTEMPTS TO COMMUICATE. PER REPORT THIS IS NOT ABNORMAL BEHAVIOR FOR THIS PT.
VITALS HOW A VEWS SCORE OF 4 RESP RATE 32 ON CPAP, SATS GREATER THAN 90% TELE
SINUS WITH PAC'S. NO S&S OF DISTRESS NOTED JUST A HIGHER RATE OF SHALLOW
BREATHS.

## 2023-09-25 NOTE — NUR
SHIFT SUMMARY:
PT SLEPT WELL ALL NIGHT; WAS MEDICATED ONCE WITH ATIVAN AND LIBRIUM. WORE
BIPAP ALL NIGHT WITHOUT ANY ISSUES. ATTENDS CHANGED THROUGHOUT THE NIGHT.

## 2023-09-25 NOTE — NUR
CALLED DR PATTON- WAITING FOR A CALL BACK-
ABG RESULTS CAME BACK WITH NO ABNORMAL VALUES. PT DIDN'T FLINCH AT ALL WHEN
ABG WAS PERFORMED. PT DID WAKE SEVERAL MINUTES AFTER THE ABG WAS PERFORMED AND
MUMBLE CRY INTO HIS CPAP MASK, ATTEMPTED TO TAKE THE MASK OFF TO ALLOW THE PT
TO COMMUNICATE, THIS DID NOT MAKE HIS SPEECH SOUND ANY MORE CLEAR THOUGH.
AWAITING A CALL BACK FROM DR PATTON. PT DID AGREE THAT HE AS HUNGRY BUT HE
FELL BACK ASLEEP BEFORE STAFF COULD ATEMPT TO GIVE HIM FOOD. FOOD HELD AT THIS
TIME FOR LETHARGYTO PREVENT ASPIRATION.

## 2023-09-26 NOTE — NUR
ASSUMPTION OF CARE
PT TRANSFERRED TO UNIT VIA HOSPITAL BED AT APPROX 1050. BEDSIDE REPORT
PERFORMED. PT AOX1-2. LETHARGIC. AROUSABLE TO VERBAL AND PAINFUL STIMULI. PT
IS VERBALLY COMMUNICATING W/ WORDS, DIFFICULTY COMMUNICATING NEEDS. THIS RN
ABLE TO UNDERSTAND INTERMITTENTLY. UNABLE TO ENGAGE IN A FULL CONVERSATION.
ANSWERS QUESTIONS W/ ONE WORD RESPONSES TO THE BEST OF HIS ABILITY. ORIENTED
TO PERSON, BIRTHDATE, AND MONTH. REQUIRES REORIENTATION TO LOCATION. PT
EXPERIENCING VISUAL HALLUCINATIONS - STATING THAT HIS WIFE WAS PRESENT AT
BEDSIDE. COOPERATIVE W/ CARE. TELEMETRY SHOWING SR W/ PAC'S AND PVC'S,
80'S-90'S. INTERMITTENT EPISODES OR IRREGULARITY AND PULSE INCREASE. HR
INCREASED , LASTING FOR 8 BEATS, THEN RETURNED TO BASELINE. NO FURTHER
EPISODES. SBP >160 UPON ARRIVAL, SUSTAINING. IV HYDRALIZINE ADMINISTERED PER
EMAR, SBP CURRENTLY <160.  TRANSFERRED TO UNIT ON BIPAP, SATS >95%.  ON 2L VIA
NC OFF OF BIPAP, SATS >95%. ABLE TO TOLERATE FOR <5 MINUTES, THEN PT BECOMES
INCREASINGLY LETHARGIC, DROWSY. CURRENTLY RESTING COMFORTABLY ON BIPAP. DURING
BEDSIDE REPORT, THIS RN WAS TOLD THAT THE PT HAD NOT HAD A BM FOR 8 DAYS, WAS
TOLD THAT MD IS AWARE.  HYPOACTIVE BOWEL TONES AUSCULTATED, ABDOMEN IS FIRM.
RCVD IN REPORT THAT PT IS INCONTINENT OF URINE, ATTENDS C/D/I UPON ARRIVAL.
BLADDER SCAN PERFORMED, SHOWING 300-400ML. MD CONTACTED, VD ORDER FOR
STRAIGHT CATH PROCEDURE.  PROCEDURE PERFORMED AND UA COLLECTED. ATTENDS AND
WRAP IN PLACE. SEE ASSESSMENT FOR MORE INFORMATION. WILL CONTINUE TO MONITOR.

## 2023-09-26 NOTE — NUR
SHIFT SUMMARY
NO FURTHER ACUTE CHANGES SINCE ASSUMPTION OF CARE. REMAINED AOX1-2.
DROWSY, SLEPT T/O THE AFTERNOON. AROUSABLE TO VERBAL STIMULI. COMMUNICATING
THROUGH ONE WORD STATEMENTS. INCREASED MENTATION THIS AFTERNOON, REQUESTING
WATER. W/ HEAD OF BED ELEVATED, SMALL SIPS OF WATER GIVEN VIA SPOON, PT
TOLERATED WELL. PO ALDACTONE, PROPANOLOL, AND BUPROPRIONE GIVEN W/ APPLESAUCE.
TELEMETRY SHOWING SR W/ PAC'S AND PVC'S, 90'S-110'S. CONTINUED INTERMITTENT
EPISODES OF IRREGULARITY AND PULSE INCREASE UP , HIGHEST 160'S. PT ON
BIPAP WHILE SLEEPING, SATS >95%. FAMILY AT BEDSIDE IN THE AFTERNOON, PT ABLE
TO TOLERATE 2L VIA NC TO VISIT, SATS >95%. NO VOID SINCE STRAIGHT
CATH PROCEDURE. BLADDER SCAN PERFORMED, RETAINING 200ML-300ML. CONTACTED
MD PRIOR TO EVENING DOSE OF LASIX, RCVD ORDER TO PLACE RODRIGUEZ. STRONG URINE
ODOR ASSESSED, RODRIGUEZ DRAINING TO GRAVITY. IV ABX INFUSING PER EMAR. PT
CURRENTLY RESTING ON BIPAP. CALL LIGHT IN REACH. WILL REPORT TO ONCOMING RN.

## 2023-09-26 NOTE — NUR
PATIENT ALERT AND ORIENTED X1-2. PERRLA, GLASSES AT BEDSIDE. MUMBLED SPEECH,
AT TIMES HARD TO UNDERSTAND AND FOLLOW PATIENT THOUGHT PROCESS. SPEAKING IN
SMALL SENTENCES. LETHARGIC. OPENING EYES AND WAKING EASILY TO TOUCH AND VERBAL
STIMULI. FOLLOWING SIMPLE COMMANDS. VERY WEAK THROUGHOUT. DENIES
NUMBNESS/TINGLING. PATIENT ON 2L NASAL CANNULA AT START OF SHIFT. TRANSITIONED
BACK TO BIPAP WHILE PATIENT IS SLEEPING. WEARING BIPAP AT THIS TIME. LUNGS
SOUNDING COARSE AND DIM IN BASES. NO COUGH NOTED. BELLY BREATHING AT TIMES.
EVEN RESPIRATIONS. TELE SHOWING SR/ST WITH PAC'S AND PVC'S. DENIES CHEST
PAIN/PRESSURE/PALPITATIONS. SBP ELEVATED, PO NIGHT CARDIAC MEDS GIVEN PER
EMAR. LOWER EXTREMITY AND GENERALIZED EDEMA. PPP. ABDOMIN DISTENDED, DENIES
PAIN. RODRIGUEZ CATH IN PLACE DRAINING CLEAR/YELLOW GRAVITY, CATH CARE COMPLETED.
LARGE INCONTINENCE OF STOOL AT START OF SHIFT. ATTENDS IN PLACE. CRAM APPLIED
TO COCCYZ AND POSTERIOR SCROTUM. SCROTUM EDEMATOUS. NEW IV PLACED IN RIGHT AC.
ABX INFUSING AT THIS TIME PER EMAR. Q2 TURNING AND AS NEEDED. PATIENT DENIES
PAIN. CALL LIGHT IN REACH.

## 2023-09-26 NOTE — NUR
SHIFT SUMMARY
PT DROWSY AT TIMES, AWAKENS TO VERBAL STIMULI BUT APPEARS CONFUSED. CURRENTLY
WEARING BIPAP AND SLEEPING OFF AND ON MUCH OF THE NIGHT. CONT O2 MONITORING,
SATS ABOVE 92% AND PT TOLERATING WELL. INCONTINENT OF URINE WITH ATTENDS IN
PLACE. POWERGLIDE IN LEONARDO. TELEMETRY: SR W/PAC'S @96. LR INFUSING @100 ML/HR.
REPLACED CLONIDINE PATCH ON LEFT SHOULDER. CALL LIGHT KEPT WITHIN REACH WITH
BED ALARM IN PLACE. WILL CONTINUE TO MONITOR UNTIL END OF SHIFT.

## 2023-09-26 NOTE — NUR
ASSUMED CARE OF PT-
REPORT RECIEVED FROM NIGHT RN, PT HAS NOT BEEN AWAKE T/O THE NIGHT. HE
APPEARED TO WAKE FOR A FEW MINUTES, AT WHICH TIME HE REQUESTED WATER, HE WAS
ASSISTED TO SIT UP AND DRINK A SINGLE SIP OF WATER. PER REPORT HE WENT BACK TO
SLEEP DIRECTLY AFTER THAT. PT LAYING IN BED, BIPAP IN PLACE AT THE TIME OF
BEDSIDE REPORT. ON ASSESSMENT NOTED LUNG SOUNDS TO BE WET RALES AND RHONCHI,
DIM IN THE BASES, BT VERY HYPOACTIVE. PUPIL RESPONSE SLUGGISH SMALL AMOUNT OF
OCCULAR EDEMA ON THE RIGHT NOTED. BIPAP MASK MOIST ON THE INSIDE CALLED RT WHO
CAME TO THE BEDSIDE. WHILE CHECKING PUPIL RESPONSE THE PT UDDERED THE FIRST
CLEAR WORDS THIS RN HAS HEARD HIM SAY "NOBODY HERE BUT US REINDEER." PT
RETURNED TO HIS PREVIOUS NON-VERBAL STATE. CALLED DR PATTON, ABX ALREADY
ORDERED AS WELL AS BLOOD, URINE AND SPUTUM CULTURES. SPOKE ABOUT WET LUNG
SOUNDS AND ELEVATED RESP RATE. CHEST XRAY ORDER PENDING AS WELL AS HEAD CT.
CURRENTLY PT IN BED WITH NO S&S OF DISTRESS.

## 2023-09-27 NOTE — NUR
SHIFT SUMMARY:
 
PATIENT REMAINS ALERT TO SELF. NO CHANGES TO NEURO. WEARING BIPAP THROUGHOUT
THE NIGHT, TAKING BREAKS FOR ORAL CARE AND MEDS. PATIENT CONTINUES TO OPEN
EYES AND FOLLOW SIMPLE COMMANDS. WHEN UNBOTHERED PATIENT FALLS ASLEEP QUICKLY.
TELE CONTINUES TO SHOW SR/ST WITH PVC'S AND PAC'S. SMALL RUN OF NONSUSTAINED
VTACH THIS AM. PATIENT NON SYMPTOMATIC. SEE SAVED TELE STRIPS. 3 MODERATE TO
LARGE SOFT BOWEL MOVEMENTS THIS SHIFT. Q2 TURNING AND AS NEEDED. POSTERIOR
SCROTUM WITH SCANT EXCORIATION AND BLEEDING, CREAM APPLIED. TAKING MEDS WHOLE
WITH APPLESAUCE. IV ABX INFUSED THROUGHOUT SHIFT. CALL LIGHT IN REACH. PATIENT
SLEEPING IN BED WITH LEFT HIP AND ARMS FLOATED.

## 2023-09-27 NOTE — NUR
Update:
 
Dr. Owens to the room to have a family discussion regarding goals of
care and code status. Patient is changed to DNR status. Patient denies other
needs at this time. Family at bedside.

## 2023-09-27 NOTE — NUR
Initial Assessment:
 
This RN entered the room to the Bi-Pap alarming, pt pulled it off and is
talking about his car not starting this morning. NC placed. Patient is
oriented to self only. He thinks that he is in [place] this morning. He is
easily reoriented. He is only able to answer with 1-2 words at a time. He
states he is having 8/10 lower back pain, pt repositioned and tylenol given at
this time. HR irreg, SR in the 70s with PACs and PVCs. LS DIM T/O with and EXP
Wheeze in the right side. He has shallow tachypnec respirations. His biox is
low 90s on RA. He is otherwise on the Bi-Pap with saturations in the mid 90s.
He has snoring respirations even with the Bi-Pap on.
BT Hypoactive, his abd is very distended. He had alot of bowel movements
last night after recieving a suppository. He has a hanson cath, patent and
draining clear yellow urine. He has an ulcer on the top of his foreskin. He
has enlarged testicles with some excoration on the bottom of them. He has
attends in place. Patient repositioned to the left side, with the assistance
of 3 staff. Patient denies other needs at this time. Call light in reach, will
continue to monitor.

## 2023-09-27 NOTE — NUR
Update:
 
Patient was assisted back to bed from the chair with the ceiling lift, he
was yelling at staff stating, "there is a bomb in here, I need you to wheel me
down the hallway and get me out of here." Shortly after he had gotten back to
bed he wanted to try and walk. With the assistance of two staff members he was
able to get to the edge of the bed and dangle. He was able to stand twice, but
not able to march in place while standing. He was assisted to lie back down.
He denies other needs at this time. Call light in reach. Blood pressure has
been on the high side, MD aware awaiting new orders.

## 2023-09-28 NOTE — NUR
END OF SHIFT NOTE
 
PT ALERT AND ORIENTED TO PERSON, PLACE, TIME, AND SITUATION. NO ACUTE CHANGES
THIS SHIFT. HR 70'S-90'S W/ WANDERING ATRIAL PACEMAKER AND MULTIPLE RUNS OF
SVT. -160'S, PT DENIES CP/PRESSURE. SPO2 >92% ON RA, PT DENIES SOB; NO
VISIBLE DYSPNEA. RECTAL TEMP PROBE IN PLACE W/ TEMPS 100.7-101.1; TYLENOL
ADMINISTERED, BLANKETS REMOVED, FAN ON PT. RODRIGUEZ CATH IN PLACE FOR ACCURATE
I&O AND RETENTION; DRAINING SUJATA URINE TO GRAVITY. NO BM THIS SHIFT. BED BATH
COMPLETED. PT UP TO CHAIR W/ LIFT FOR MEALS AND THERAPY. REPOSITIONED T/O
SHIFT. ABLE TO SWALLOW MEDS WHOLE WITH APPLESAUCE. PT DENIES OTHER NEEDS AT
THIS TIME. CALL LIGHT WITHIN REACH.

## 2023-09-28 NOTE — NUR
SHIFT SUMMARY
PATIENT ALERT AND ORIENTED TO SELF. PATIENT AWAKE AND CONVERSING WITH STAFF,
ABLE TO FOLLOW DIRECTIONS, MUMBLES. MEDICATED PER EMAR FOR PAIN. ON ROOM AIR
WHILE AWAKE AND ON CPAP WHILE SLEEPING, NO COMPLAINTS OF SHORTNESS OF BREATH,
PATIENT TACHYPNIC AT TIMES, LUNGS DIMINISHED. SBP IN 'S, HAD TWO RUNS
OF VTACH. NO COMPLAINTS OF CHEST PAIN. WILL CONTINUE TO MONITOR. CALL LIGHT
WITHIN REACH.

## 2023-09-28 NOTE — NUR
INCREASED TEMPERATURE
 
PT WITH INCREASED TEMP UP .1; TYLENOL ADMINISTERED PER EMAR. RECTAL
THERMOMETER IN PLACE, CURRENT TEMP 100.9. BLANKETS REMOVED, FAN ON PT.

## 2023-09-29 NOTE — NUR
MENTATION CHANGE
 
THIS RN AT PT BEDSIDE TO REASSESS ORIENTATION. THIS AM, PT WAS ABLE TO STATE
HIS NAME, BIRTHDATE, YEAR, AND THE PRESIDENT. HE BELIEVED HE WAS AT HOME AT
THE TIME OF ASSESSMENT.
 
THIS PM, PT IS ALERT AND ORIENTED ONLY TO HIMSELF. PT BELIEVES HIS IS IN
[place] FOR BACK SURGERY/IMAGING. HE STATES THAT THE YEAR IS 2000 AND THE
PRESIDENT IS ANGY FISHMAN. THIS RN ALSO OBSERVED PT CALLING FOR HIS WIFE,
GIVING HER DIRECTIONS TO FOLLOW, BUT SHE WAS NOT PRESENT IN THE ROOM. PT
WAS TALKING TO HIS NEPHEW AND PROVIDING INSTRUCTIONS FOR PAPERWORK, BUT HIS
NEPHEW WAS NOT PRESENT IN THE ROOM. PT STATES HE IS TIRED AND WANTS TO SLEEP;
BIPAP PLACED ON PT.

## 2023-09-29 NOTE — NUR
SHIFT SUMMARY
PATIENT ALERT AND ORIENTED TO SELF. CONFUSION INCREASED OVERNIGHT BUT REMAINED
PLEASANT AND COOPORATIVE WITH CARE. PATIENT REQUESTED ON AND OFF THE BIPAP
SEVERAL TIMES, OTHERWISE ON ROOM AIR WITH SPO2 IN THE HIGH 90'S. VITAL SIGNS
STABLE, SINUS RHYTHM ON TELE, HAD A RUN OF VTACH. WILL CONTINUE TO MONITOR.
CALL LIGHT WITHIN REACH.

## 2023-09-29 NOTE — NUR
END OF SHIFT NOTE
 
PT ALERT, ORIENTED X1-3; SEE PREVIOUS NOTES REGARDING MENTATION CHANGES. ABLE
TO CALL APPROPRIATELY AND MAKE NEEDS KNOWN TO STAFF. COOPERATIVE WITH CARE. HR
WANDERING ATRIAL PACEMAKER, RATE 70-90'S WITH INCREASES -170'S AT REST.
MULTIPLE RUNS OF SVT AND VTAMD ROSE AWARE. -140'S, PT DENIES
CP/PRESSURE. SPO2 >90% ON RA, BIPAP WHILE SLEEPING. AFEBRILE. RODRIGUEZ
CATH IN PLACE DRAINING TEA-COLORED URINE TO GRAVITY; 275ML OUTPUT THIS SHIFT.
1 SMALL SMEAR BM. PT UP TO CHAIR FOR SEVERAL HOURS WITH LIFT. REPOSITIONED
FREQUENTLY T/O SHIFT. CALL LIGHT WITHIN REACH, BED IN LOWEST POSITION. BED
ALARM ON.

## 2023-09-29 NOTE — NUR
FAMILY UPDATE
 
PT'S WIFE CALLED FOR UPDATE. DISCUSSED CURRENT CONDITION AND PLAN OF CARE.
UPDATED ON MENTATION CHANGES AND HR MANAGEMENT. PT'S WIFE VOICED UNDERSTANDING
OF THIS.

## 2023-09-29 NOTE — NUR
PHYSICIAN CONTACT
 
CALL PLACED TO MD HENSLEY REGARDING POTASSIUM REDRAW. ORDERS RECEIVED FOR
BMP TO BE DRAWN NOW AS THE POTASSIUM INFUSION IS COMPLETE. ORDERS RECEIVED
FOR LASIX TO BE ADMINISTERED PER EMAR IF POTASSIUM REDRAW IS ABOVE 4.0.

## 2023-09-30 NOTE — NUR
END OF SHIFT NOTE:
PT ALERT AT TIMES BUT LETHARGIC THE MAJORITY OF SHIFT. ORIENTED TO SELF;
ORIENTATION TO TIME/PLACE/SITUATION VARY T/O THE SHIFT. ABLE TO FOLLOW MOST
COMMANDS AND COMMUNICATE NEEDS WITH STAFF. HR 60-80'S, WANDERING ATRIAL
PACEMAKER. 'S. SPO2 >92% ON RA WHILE AWAKE; BIPAP WHILE RESTING. TEMP
100.1 THIS AM, FAN APPLIED AND BLANKETS REMOVED. AFEBRILE REMAINDER OF SHIFT.
RODRIGUEZ CATH IN PLACE DRAINING YELLOW URINE; URINE SAMPLE COLLECTED AND SENT PER
ORDERS. NO BM THIS SHIFT. REPOSITIONED T/O SHIFT. CAMERA MONITORING IN PLACE
FOR PT SAFETY D/T CONFUSION. BED ALARM ON. CALL LIGHT WITHIN REACH, BED IN
LOWEST POSITION.

## 2023-09-30 NOTE — NUR
SHIFT SUMMARY
PATIENT ALERT AND ORIENTED TO SELF ONLY. HE WAS VERY CONFUSED, HAVING
DIFFICULTY FOLLOWING DIRECTIONS, HALLUCINATING, AND FREQUENTLY TRYING TO GET
OUT OF BED BY HIMSELF AND AT ONE POINT ALMOST FELL ON THE FLOOR DUE TO THIS,
REQUIRED BEING PLACED ON CAMERA MONITORING FOR SAFETY. PATIENT REPORTED THAT
HE NEEDED TO HAVE A BOWEL MOVEMENT BUT WAS UNABLE TO, ADMINISTERED A
SUPPOSITORY FOR CONSTIPATION. PATIENT DID NOT SLEEP ALL NIGHT. CALLED DR BELTRÁN
TO NOTIFY HER OF THIS STATE OF INCREASED CONFUSION AND NOTED THAT THE PATIENT
HAD NOT WORN HIS BIPAP MUCH IN THE LAST COUPLE DAYS. ASKED DR BELTRÁN FOR A VBG
TO SEE IF THIS COULD BE THE REASON FOR THE INCREASED CONFUSION. ALSO NOTIFIED
HER THAT THE PATIENT'S POTASSIUM WAS 3.0 THIS MORNING. WILL CONTINUE TO
MONITOR. CALL LIGHT WITHIN REACH.

## 2023-09-30 NOTE — NUR
NEURO UPDATE
PT AWOKE AROUND 1500 AND WAS REMOVED FROM BIPAP. PT ABLE TO STATE NAME,
BIRTHDATE, MONTH/YEAR, AND PRESIDENT. UNSURE OF PLACE AND WHY HE IS IN THE
HOSPITAL. PT ABLE TO FOLLOW MOST COMMANDS AND PARTICIPATE IN CARE. SPEECH
REMAINS SLOW. PT ABLE TO COMMUNICATE NEEDS WITH STAFF.

## 2023-10-01 NOTE — NUR
End of shift note.
 
Pt has rested for much of the shift. Mentation seems to be about the same as
dayshift. Pt is oriented x2, has periods of clarity.
 
Pt was repositioned as tolerated. Best in place.
 
Pt is able to make needs known, call light is within reach. Camera is in
place.

## 2023-10-01 NOTE — NUR
SHIFT SUMMARY:
 
PT HAS BEEN ALERT T/OUT THE DAY, ORIENTATION IMPROVES T/OUT THE DAY, PT
ANSWERING QUESTIONS APPROPRIATELY AND COOPERATIVE W/CARE, ABLE TO MAKE NEEDS
KNOWN. PT DENIES SOB, O2 SATS >93% ON RA WHILE AWAKE, PT USING BIPAP AT NOC,
FAINT WHEEZES HEARD THIS AM HAVE CLEARED T/OUT SHIFT. AFIB ON MONITOR W/RATE
IN 80s, PT DENIES CP. INDWELLING RODRIGUEZ CATHETER CONTINUES PATENT, DRAINING TO
GRAVITY, CATHETER CARE PROVIDED PER PROTOCOL. PT W/MULTIPLE BM ATTEMPTS TODAY
W/RESULT BEING MINIMAL OUTPUT/SMEARS. PT DOES EXIT BED W/2 PERSON SBA AND FWW,
SPENDS AFTERNOON IN RECLINER. AT THIS TIME, PT IS RESTING IN BED W/DINNER TRAY
AND CALL LIGHT WITHIN REACH. ALL ASSESSMENTS FOR IGNITABLE SOURCES HAVE BEEN
NEGATIVE. WILL CONTINUE TO MONITOR AND TREAT ACCORDINGLY UNTIL CHANGE OF
SHIFT.

## 2023-10-02 NOTE — NUR
SHIFT SUMMARY
PT'S MENTATION REMAINS THE SAME. PT USING CALL LIGHT THIS SHIFT, BUT BED ALARM
IN PLACE FOR SAFETY. VS STABLE. O2 SATS REMAIN ABOVE 90% ON RA. PT COMPLAINS
OF PAIN ON AND OFF TO HIS BACK. PT ABLE TO TRANSFER TO RECLINER WITH 2 ASSIST,
GB AND FWW. PT UP TO CHAIR FOR LUNCH AND AGAIN FOR DINNER. PT REPORTS FEELING
WORN OUT AFTER BEING UP FOR AN HOUR. PT REPOSITIONED Q2H AND MORE OFTEN AS
NEEDED. WILL CONTINUE TO MONITOR AND REPORT TO ONCOMING RN

## 2023-10-02 NOTE — NUR
UPDATE
PT AWAKE AND ALERT. PT CONFUSED AT TIMES, BUT REDIRECTABLE. CAMERA MONITORING
TURNED OFF THIS AM AND PT CALLING APPROPRIATELY. BED ALARM ON FOR SAFETY. PT
REPOSITIONING IN THE BED. O2 SATS >90% ON RA. TELEMETRY DISCONTINUED. PT
COMPLAINS OF MILD BACK PAIN. WILL CONTINUE TO MONITOR CLOSELY

## 2023-10-02 NOTE — NUR
End of shift note.
 
Pt has rested for much of the shift. Pt has removed CPAP multiple times and
needs to be reoriented but does go back to sleep. Mentation seems to be about
the same. A&Ox2 with periods of clarity.
 
Pt is able to make needs known,call light is within reach. Bed alarm is
active. Sitter camera is engaged.

## 2023-10-03 NOTE — NUR
ASSUMED CARE OF PT AT 0700 THIS AM. NO ACUTE EVENTS T/O THE SHIFT. PT WORKED
WITH PT/OT AND WALKED IN THE ROOM WITH THE WALKER TO THE RESTROOM AND BACK
WITH THIS RN. BATH AND SHAVE TODAY. PLAN IS TO DC TO REHAB WHEN PT IS READY
AND BED AVAILABLE. BED/CHAIR ALARM FOR SAFETY. RODRIGUEZ REMOVED TODAY AND PT ABLE
TO VOID. PT IS ABLE TO USE CALL LIGHT FOR NEEDS.
REPORT CALLED TO THEO MOYA AND PT MOVED WITH ALL BELONGINGS .
PT'S WIFE NOTIFIED OF ROOM CHANGED.

## 2023-10-04 NOTE — NUR
SHIFT SUMMARY:
 
PT HAS BEEN PLEASANT AND COOPERATIVE WITH STAFF. HE IS ALERT AND ORIENTED TO
PERSON, PLACE, TIME/DATE, AND SITUATION.
 
HE DOES HAVE EVENTS OF CONFUSION OF SITUATION AND OVER ESTIMATES HIS OWN
ABLITIES. HE CONTINUES TO GET UP OUT OF HIS BED/CHAIR TODAY WITHOUT CALLING
FOR ASSISTANCE FIRST AND STATED WHEN WE WOULD ADVISE HIM TO USE HIS CALL LIGHT
HE STATED THAT WHEN THE ALARMS WENT OFF THAT HIS HOW HE CALLED FOR HELP.
 
HE PARTICIPATED WITH OT AND PT TODAY AND WOULD AMBULATE TO THE RESTROOM.
 
HE IS IN BED, CALL LIGHT WITHIN REACH, NO SIGNS OF SYMPTOMS OF DISTRESS.
 
PLAN OF CARE ONGOING.

## 2023-10-04 NOTE — NUR
SHIFT SUMMARY. PT HAD A FALL EARLY IN SHIFT IN BATHROOM DURING TRANSFER.
COMPLETED PROCESS INTERVENTION, COMPLETED IRIS REPORT, AND NOTIFIED
HOSPITALIST ALYX. NO ORDERS FROM ALYX UNLESS SPECIFIC PAIN STARTS TO PRESENT
AND WE CAN DO XRAY ON THE AREA AND IF THERE ARE ANY CHANGES IN MENTATION WE
CAN PERFORM CT. NO SUCH DEVELOPMENTS THUS FAR. POST FALL ASSESSMENT UNCHANGED
FROM PRE-FALL. NO CHANGE IN MENTATION. AFTER THAT, PT HAS SLEPT THROUGH MOST
OF SHIFT. REINFORCED IMPORTANCE OF CALLING FOR ASSISTANCE BEFORE GETTING OOB
TO WHICH PT VOICED UNDERSTANDING, BED ALARM REMAINS IN PLACE FOR SAFETY. CALL
LIGHT IN REACH. PT HAS WORN CPAP THROUGHOUT MOST OF SHIFT. WOKE EARLY THIS
MORNING SAYING THAT "IT IS TIME FOR HIM TO GO HOME" BUT WAS EASILY
REORIENTED/REDIRECTED AND IS NOW RESTING COMFORTABLY IN BED. AOX2-3 THROUGHOUT
SHIFT. NO PAIN REPORTED. SATTING WELL WITH CPAP, CONTINUOUS PULSE OX IN PLACE.
BED LOCKED IN LOWEST POSITION.CALL LIGHT LEFT WITHIN REACH.

## 2023-10-05 NOTE — NUR
shift summary. shift has been unremarkable. pt has slept through most of shift
after med pass and shift assessment but awakes very frequently to void. steady
one person assist to bathroom. does not consistently use call light but bed
alarm remains in place for safety. continent thus far this shift. pt was
aox3-4 at time of shift assessment but mentation has fluctuated throughout
shift, easily reoriented/redirected. satting well on room air while awake and
using cpap while sleeping, continuous pulse ox in place. bed locked in lowest
position. call light left iwhtin reach.

## 2023-11-16 NOTE — NUR
From: Steve Simmons  To: Dr. Haresh Warner: 11/15/2023 10:07 PM EST  Subject: Refill    Need a refill in my Xanax, have (2) pills/(4) days at 1/2 per day left.  Thank you, have a nice Thanksgiving !! YVAN PANG GAVE VERBAL OK TO GIVE ALL AM MEDS. MD BISHOP PT'S HR
WAS 52.

## 2025-02-13 NOTE — NUR
BRACHIAL PLEXUS BLOCK
TIMEOUT 1309 PERFORMED WITH DR. RIVERA
START:1312
END: 1321
PT KELLY WELL, PLACED ON 3L O2 THROUGHOUT PROCEDURE. VSS

## 2025-02-13 NOTE — NUR
POST-OP
PATIENT ARRIVED TO ROOM 219 @ 1730. VITALS STABLE, DRESSING INTACT AND CLEAN.
DENIES PAIN, DECERASED SENSATION TO RIGHT SHOULDER AND HUMERUS AREA. UNABLE TO
WIGGLE FINGERS. CAP REFILL IS <3. TOLERATING PO INTAKE.CALL LIGHT IN REACH ,
VISITOR IN ROOM

## 2025-02-14 NOTE — NUR
DISCHARGE
PATIENT VOIDIED 500 AND WAS DISCHARGED HOME WITH ALL INSTRUCTIONS, AND
BELONGINGS. IV TAKEN OUT INTACT. LEAVES VIA PRIVATE VEHICLE.

## 2025-02-14 NOTE — NUR
SHIFT SUMMARY
POD 1 S/P RIGHT SHOULDER REPAIR. PRINEO DRESSING CDI, NO DRAINAGE NOTED. CRYO
AND IMMOBILIZER IN PLACE TO RIGHT SHOULDER T/O SHIFT. PAIN MANAGED PER EMAR
AND ICE THERAPY.  UP IN ROOM/BATHROOM WITH CANE, GB, SBA. SPO2 ABOVE 95% ON
RA, USING CPAP WHILE SLEEPING R/T ZAIRA.  VOID X 1 POST OP  ML SUJATA
URINE. BLADDER SCAN AND STRAIGHT CATH COMPLETED PER PROTOCOL.  PT ENCOURAGED
PO FLUID T/O SHIFT. DENIES N/V, KELLY REG DIET. PLAN TO WORK WITH THERAPY TODAY.
REPORT GIVEN TO DAY RN.

## 2025-02-22 NOTE — NUR
SHIFT SUMMARY:
 
NO EVENTS OR CHANGES WITH THE PATIENT THROUGHOUT THE SHIFT. HE IS
A&OX4/INDEPENDENT, CALLS APPROPRIATELY AND IS PLEASANT AND COOPERATIVE WITH
CARE. PATIENT OXYGEN SATURATION GREATER THAN 92% ON ROOM AIR, BUT FEELS MORE
COMFORTABLE WEARING THE 1 LITER SO CONTINUES TO WEAR IT FOR COMFORT WHEN FEELS
SHORT OF BREATH; MAINLY WITH ACTIVITY.
 
INCENTIVE SPIROMETER AND FLUTTER VALVE PROVIDED AND EDUCATED FOR PNEUMONIA AND
PHLEGM.
 
HE IS IN BED, CALL LIGHT WITHIN REACH, NO SIGNS OR SYMPTOMS OF DISTRESS, PLAN
OF CARE ONGOING.

## 2025-02-23 NOTE — NUR
PT DISCHARGED HOME. FRIEND AT BEDSIDE WILL DRIVE PT HOME. DISCHARGE
INSTRUCTIONS DISCUSSED WITH PT. INSTRUCTED PT TO START NEW MEDICATIONS
TOMORROW AS ORDERED BY DR. CEJA. PT VERBALIZED UNDERSTANDING, NO QUESTIONS
OR CONCERNS AT THIS TIME

## 2025-02-23 NOTE — NUR
SHIFT SUMMARY:
PT AOX4, IND IN THE ROOM, TOLERATING MEDICATIONS WELL. PT ANXIOUS ABOUT NOT
HAVING SOME OF HIS BREATHING TREATMENTS/ INHALERS THAT HE TAKES AT HOME IN THE
HOSPITAL. RT VISITED WITH THE PT AND PT INSTRUCTED TO HAVE FAMILY OR FRIEND
BRING IN MEDICATIONS AND THEN GET THEM VERIFIED WITH PHARMACY. ALSO INSTRUCTED
TO DISCUSS THE MEDICATIONS WITH HOSPITALIST BEFORE TAKING THEM. CONCERNED
ABOUT FUTURE LUNG HEALTH AND HOW HE ACQUIRED THE PNEUMONIA. PT ANXIOUS AND IN
A LOT OF PAIN. MEDICATED PER EMR. STILL PLEASANT AND COOPERATIVE IN CARE. JUST
UPSET ABOUT SOME OF THE HICCUPS AND PERCIEVED MISCOMMUNICATIONS. EDUCATED ON
USE OF IS AND FLUTTER VALVE. PT TOLERATING MEDICATIONS AND NO ACUTE EVENTS
OVERNIGHT. PT RESTING IN BED, BED IN LOWEST POSITION, CALL LIGHT IN REACH.
CONTINUING CARE.